# Patient Record
Sex: MALE | Race: BLACK OR AFRICAN AMERICAN | Employment: FULL TIME | URBAN - METROPOLITAN AREA
[De-identification: names, ages, dates, MRNs, and addresses within clinical notes are randomized per-mention and may not be internally consistent; named-entity substitution may affect disease eponyms.]

---

## 2017-11-14 ENCOUNTER — HOSPITAL ENCOUNTER (EMERGENCY)
Age: 47
Discharge: HOME | End: 2017-11-14
Attending: EMERGENCY MEDICINE
Payer: OTHER GOVERNMENT

## 2017-11-14 ENCOUNTER — APPOINTMENT (OUTPATIENT)
Dept: GENERAL RADIOLOGY | Age: 47
End: 2017-11-14
Attending: EMERGENCY MEDICINE
Payer: OTHER GOVERNMENT

## 2017-11-14 ENCOUNTER — APPOINTMENT (OUTPATIENT)
Dept: CT IMAGING | Age: 47
End: 2017-11-14
Attending: EMERGENCY MEDICINE
Payer: OTHER GOVERNMENT

## 2017-11-14 VITALS
HEIGHT: 73 IN | WEIGHT: 210 LBS | OXYGEN SATURATION: 98 % | TEMPERATURE: 97.8 F | RESPIRATION RATE: 20 BRPM | DIASTOLIC BLOOD PRESSURE: 95 MMHG | HEART RATE: 74 BPM | BODY MASS INDEX: 27.83 KG/M2 | SYSTOLIC BLOOD PRESSURE: 133 MMHG

## 2017-11-14 DIAGNOSIS — R51.9 HEADACHE, UNSPECIFIED HEADACHE TYPE: Primary | ICD-10-CM

## 2017-11-14 LAB
ALBUMIN SERPL-MCNC: 3.6 G/DL (ref 3.4–5)
ALBUMIN/GLOB SERPL: 1 {RATIO} (ref 0.8–1.7)
ALP SERPL-CCNC: 72 U/L (ref 45–117)
ALT SERPL-CCNC: 35 U/L (ref 16–61)
AMPHET UR QL SCN: NEGATIVE
ANION GAP BLD CALC-SCNC: 13 MMOL/L (ref 10–20)
ANION GAP SERPL CALC-SCNC: 7 MMOL/L (ref 3–18)
APPEARANCE UR: CLEAR
ASPIRIN TEST, ASPIRN: 575 ARU
AST SERPL-CCNC: 22 U/L (ref 15–37)
BARBITURATES UR QL SCN: NEGATIVE
BASOPHILS # BLD: 0 K/UL (ref 0–0.06)
BASOPHILS NFR BLD: 0 % (ref 0–2)
BENZODIAZ UR QL: NEGATIVE
BILIRUB SERPL-MCNC: 0.5 MG/DL (ref 0.2–1)
BILIRUB UR QL: NEGATIVE
BUN BLD-MCNC: 12 MG/DL (ref 7–18)
BUN SERPL-MCNC: 11 MG/DL (ref 7–18)
BUN/CREAT SERPL: 10 (ref 12–20)
CA-I BLD-MCNC: 1.24 MMOL/L (ref 1.12–1.32)
CALCIUM SERPL-MCNC: 8.7 MG/DL (ref 8.5–10.1)
CANNABINOIDS UR QL SCN: NEGATIVE
CHLORIDE BLD-SCNC: 104 MMOL/L (ref 100–108)
CHLORIDE SERPL-SCNC: 104 MMOL/L (ref 100–108)
CK MB CFR SERPL CALC: NORMAL % (ref 0–4)
CK MB SERPL-MCNC: <1 NG/ML (ref 5–25)
CK SERPL-CCNC: 261 U/L (ref 39–308)
CO2 BLD-SCNC: 29 MMOL/L (ref 19–24)
CO2 SERPL-SCNC: 29 MMOL/L (ref 21–32)
COCAINE UR QL SCN: NEGATIVE
COLOR UR: YELLOW
CREAT SERPL-MCNC: 1.08 MG/DL (ref 0.6–1.3)
CREAT UR-MCNC: 1 MG/DL (ref 0.6–1.3)
DIFFERENTIAL METHOD BLD: ABNORMAL
EOSINOPHIL # BLD: 0.2 K/UL (ref 0–0.4)
EOSINOPHIL NFR BLD: 4 % (ref 0–5)
ERYTHROCYTE [DISTWIDTH] IN BLOOD BY AUTOMATED COUNT: 13.3 % (ref 11.6–14.5)
FIBRINOGEN PPP-MCNC: 312 MG/DL (ref 210–451)
GLOBULIN SER CALC-MCNC: 3.7 G/DL (ref 2–4)
GLUCOSE BLD STRIP.AUTO-MCNC: 119 MG/DL (ref 70–110)
GLUCOSE BLD STRIP.AUTO-MCNC: 130 MG/DL (ref 74–106)
GLUCOSE SERPL-MCNC: 128 MG/DL (ref 74–99)
GLUCOSE UR STRIP.AUTO-MCNC: NEGATIVE MG/DL
HCT VFR BLD AUTO: 42.4 % (ref 36–48)
HCT VFR BLD CALC: 46 % (ref 36–49)
HDSCOM,HDSCOM: NORMAL
HGB BLD-MCNC: 14.9 G/DL (ref 13–16)
HGB BLD-MCNC: 15.6 G/DL (ref 12–16)
HGB UR QL STRIP: NEGATIVE
INR PPP: 1 (ref 0.8–1.2)
KETONES UR QL STRIP.AUTO: NEGATIVE MG/DL
LEUKOCYTE ESTERASE UR QL STRIP.AUTO: NEGATIVE
LYMPHOCYTES # BLD: 1.8 K/UL (ref 0.9–3.6)
LYMPHOCYTES NFR BLD: 38 % (ref 21–52)
MCH RBC QN AUTO: 32.1 PG (ref 24–34)
MCHC RBC AUTO-ENTMCNC: 35.1 G/DL (ref 31–37)
MCV RBC AUTO: 91.4 FL (ref 74–97)
METHADONE UR QL: NEGATIVE
MONOCYTES # BLD: 0.9 K/UL (ref 0.05–1.2)
MONOCYTES NFR BLD: 18 % (ref 3–10)
NEUTS SEG # BLD: 1.9 K/UL (ref 1.8–8)
NEUTS SEG NFR BLD: 40 % (ref 40–73)
NITRITE UR QL STRIP.AUTO: NEGATIVE
OPIATES UR QL: NEGATIVE
P2Y12 PLT RESPONSE,PPPR: 231 PRU (ref 194–418)
PCP UR QL: NEGATIVE
PH UR STRIP: 5.5 [PH] (ref 5–8)
PLATELET # BLD AUTO: 162 K/UL (ref 135–420)
PMV BLD AUTO: 11 FL (ref 9.2–11.8)
POTASSIUM BLD-SCNC: 3.4 MMOL/L (ref 3.5–5.5)
POTASSIUM SERPL-SCNC: 3.4 MMOL/L (ref 3.5–5.5)
PROT SERPL-MCNC: 7.3 G/DL (ref 6.4–8.2)
PROT UR STRIP-MCNC: NEGATIVE MG/DL
PROTHROMBIN TIME: 13.1 SEC (ref 11.5–15.2)
RBC # BLD AUTO: 4.64 M/UL (ref 4.7–5.5)
SODIUM BLD-SCNC: 141 MMOL/L (ref 136–145)
SODIUM SERPL-SCNC: 140 MMOL/L (ref 136–145)
SP GR UR REFRACTOMETRY: >1.03 (ref 1–1.03)
THROMBIN TIME: 17.3 SECS (ref 13.8–18.2)
TROPONIN I SERPL-MCNC: <0.02 NG/ML (ref 0–0.04)
UROBILINOGEN UR QL STRIP.AUTO: 0.2 EU/DL (ref 0.2–1)
WBC # BLD AUTO: 4.7 K/UL (ref 4.6–13.2)

## 2017-11-14 PROCEDURE — 85576 BLOOD PLATELET AGGREGATION: CPT | Performed by: EMERGENCY MEDICINE

## 2017-11-14 PROCEDURE — 80307 DRUG TEST PRSMV CHEM ANLYZR: CPT | Performed by: EMERGENCY MEDICINE

## 2017-11-14 PROCEDURE — 85610 PROTHROMBIN TIME: CPT | Performed by: EMERGENCY MEDICINE

## 2017-11-14 PROCEDURE — 85025 COMPLETE CBC W/AUTO DIFF WBC: CPT | Performed by: EMERGENCY MEDICINE

## 2017-11-14 PROCEDURE — 85670 THROMBIN TIME PLASMA: CPT | Performed by: EMERGENCY MEDICINE

## 2017-11-14 PROCEDURE — 85384 FIBRINOGEN ACTIVITY: CPT | Performed by: EMERGENCY MEDICINE

## 2017-11-14 PROCEDURE — 99285 EMERGENCY DEPT VISIT HI MDM: CPT

## 2017-11-14 PROCEDURE — 86900 BLOOD TYPING SEROLOGIC ABO: CPT | Performed by: EMERGENCY MEDICINE

## 2017-11-14 PROCEDURE — 93005 ELECTROCARDIOGRAM TRACING: CPT

## 2017-11-14 PROCEDURE — 70496 CT ANGIOGRAPHY HEAD: CPT

## 2017-11-14 PROCEDURE — 74011636320 HC RX REV CODE- 636/320: Performed by: EMERGENCY MEDICINE

## 2017-11-14 PROCEDURE — 74011250637 HC RX REV CODE- 250/637: Performed by: EMERGENCY MEDICINE

## 2017-11-14 PROCEDURE — 82962 GLUCOSE BLOOD TEST: CPT

## 2017-11-14 PROCEDURE — 80047 BASIC METABLC PNL IONIZED CA: CPT

## 2017-11-14 PROCEDURE — 80053 COMPREHEN METABOLIC PANEL: CPT | Performed by: EMERGENCY MEDICINE

## 2017-11-14 PROCEDURE — 82550 ASSAY OF CK (CPK): CPT | Performed by: EMERGENCY MEDICINE

## 2017-11-14 PROCEDURE — 81003 URINALYSIS AUTO W/O SCOPE: CPT | Performed by: EMERGENCY MEDICINE

## 2017-11-14 PROCEDURE — 74011000258 HC RX REV CODE- 258: Performed by: EMERGENCY MEDICINE

## 2017-11-14 PROCEDURE — 70450 CT HEAD/BRAIN W/O DYE: CPT

## 2017-11-14 RX ORDER — ASPIRIN 81 MG/1
81 TABLET ORAL DAILY
Qty: 90 TAB | Refills: 6 | Status: SHIPPED | OUTPATIENT
Start: 2017-11-14

## 2017-11-14 RX ORDER — GUAIFENESIN 100 MG/5ML
162 LIQUID (ML) ORAL
Status: COMPLETED | OUTPATIENT
Start: 2017-11-14 | End: 2017-11-14

## 2017-11-14 RX ORDER — SODIUM CHLORIDE 9 MG/ML
100 INJECTION, SOLUTION INTRAVENOUS ONCE
Status: COMPLETED | OUTPATIENT
Start: 2017-11-14 | End: 2017-11-14

## 2017-11-14 RX ORDER — BUTALBITAL, ACETAMINOPHEN AND CAFFEINE 300; 40; 50 MG/1; MG/1; MG/1
1 CAPSULE ORAL
Qty: 12 CAP | Refills: 0 | Status: SHIPPED | OUTPATIENT
Start: 2017-11-14

## 2017-11-14 RX ADMIN — ASPIRIN 81 MG 162 MG: 81 TABLET ORAL at 22:36

## 2017-11-14 RX ADMIN — SODIUM CHLORIDE 95 ML: 900 INJECTION, SOLUTION INTRAVENOUS at 20:42

## 2017-11-14 RX ADMIN — IOPAMIDOL 80 ML: 755 INJECTION, SOLUTION INTRAVENOUS at 20:42

## 2017-11-14 NOTE — LETTER
NOTIFICATION RETURN TO WORK / SCHOOL 
 
11/14/2017 10:00 PM 
 
Mr. Jus Nair Scaryasmeen To Whom It May Concern: 
 
Jus Nair is currently under the care of [facility] EMERGENCY DEPT. He will return to work/school on: 11/16/17 If there are questions or concerns please have the patient contact our office. Sincerely, Joe Mandujano MD

## 2017-11-15 LAB
ABO + RH BLD: NORMAL
ATRIAL RATE: 79 BPM
BLOOD GROUP ANTIBODIES SERPL: NORMAL
CALCULATED P AXIS, ECG09: 61 DEGREES
CALCULATED R AXIS, ECG10: 9 DEGREES
CALCULATED T AXIS, ECG11: 15 DEGREES
DIAGNOSIS, 93000: NORMAL
P-R INTERVAL, ECG05: 136 MS
Q-T INTERVAL, ECG07: 368 MS
QRS DURATION, ECG06: 94 MS
QTC CALCULATION (BEZET), ECG08: 421 MS
SPECIMEN EXP DATE BLD: NORMAL
VENTRICULAR RATE, ECG03: 79 BPM

## 2017-11-15 NOTE — ED TRIAGE NOTES
Pt c/o headache x 2 days with pressure behind L eye. Denies vision changes. States he has a stroke 2 years ago with same symptoms.

## 2017-11-15 NOTE — ED PROVIDER NOTES
HPI Comments: Eddi Coelho is a 56 y.o. Male who states he was dx with cva in [place] about 2 years ago on ct, mri, after presenting to ER with left sided headache and left eye pressure. States he has had similar headache on left side, retroorbital, constant pressure for last 2 days with no associated eye redness, drainage, new visual changes, weakness, tingling, numbness, diff speaking, swallowing, walking. No neck pain, facial droop. No exacerbating factors. 2 pills at home today with helped although cannot remember what they were. Has not been compliant with aspirin. 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 The history is provided by the patient and medical records. Past Medical History:   Diagnosis Date    Hematospermia     Hematuria     High cholesterol     Hypercholesteremia     Hypertension     Testicular pain        Past Surgical History:   Procedure Laterality Date    HX ORTHOPAEDIC  2010    left achillies    HX ORTHOPAEDIC  1986    left elbow         Family History:   Problem Relation Age of Onset    Cancer Mother      colon       Social History     Social History    Marital status:      Spouse name: N/A    Number of children: N/A    Years of education: N/A     Occupational History    Not on file. Social History Main Topics    Smoking status: Never Smoker    Smokeless tobacco: Never Used    Alcohol use Yes      Comment: occasionally    Drug use: No    Sexual activity: Yes     Other Topics Concern    Not on file     Social History Narrative         ALLERGIES: Review of patient's allergies indicates no known allergies. Review of Systems   Constitutional: Negative for fever. HENT: Negative for congestion, ear pain, facial swelling, sore throat and trouble swallowing. Eyes: Positive for visual disturbance. Respiratory: Negative for cough. Cardiovascular: Negative for chest pain.    Gastrointestinal: Negative for abdominal pain, nausea and vomiting. Endocrine: Negative for polyuria. Musculoskeletal: Negative for gait problem. Skin: Negative for rash. Allergic/Immunologic: Negative for immunocompromised state. Neurological: Positive for headaches. Negative for dizziness, syncope, facial asymmetry, speech difficulty, weakness and numbness. Hematological: Does not bruise/bleed easily. Psychiatric/Behavioral: Positive for sleep disturbance. Negative for confusion. Vitals:    11/14/17 2125 11/14/17 2130 11/14/17 2135 11/14/17 2140   BP: (!) 142/97 (!) 131/97 (!) 127/98 (!) 131/95   Pulse: 75 76 79 77   Resp: 17 20 18 21   Temp:       SpO2: 97% 97% 97% 97%   Weight:       Height:                Physical Exam   Constitutional: He is oriented to person, place, and time. Non-toxic appearance. He does not appear ill. No distress. HENT:   Head: Normocephalic and atraumatic. Right Ear: External ear normal.   Left Ear: External ear normal.   Nose: Nose normal.   Mouth/Throat: Oropharynx is clear and moist. No oropharyngeal exudate. Eyes: Conjunctivae and EOM are normal. Pupils are equal, round, and reactive to light. Right eye exhibits no discharge. Left eye exhibits no discharge, no exudate and no hordeolum. No foreign body present in the left eye. Left conjunctiva is not injected. Left conjunctiva has no hemorrhage. No scleral icterus. Left eye exhibits normal extraocular motion and no nystagmus. Fundoscopic exam:       The left eye shows no arteriolar narrowing, no hemorrhage and no papilledema. Neck: Normal range of motion. Cardiovascular: Normal rate, regular rhythm, normal heart sounds and intact distal pulses. Pulmonary/Chest: Effort normal and breath sounds normal. No respiratory distress. Abdominal: Soft. There is no tenderness. Musculoskeletal: Normal range of motion. He exhibits no edema. Neurological: He is alert and oriented to person, place, and time.  He displays no tremor. No cranial nerve deficit (3-12) or sensory deficit (gross touch intact). He exhibits normal muscle tone. He displays no seizure activity. Coordination and gait normal. GCS eye subscore is 4. GCS verbal subscore is 5. GCS motor subscore is 6.   nihss 0   Skin: Skin is warm and dry. He is not diaphoretic. Psychiatric: His behavior is normal.   Nursing note and vitals reviewed.        [facility]  ED Course       Procedures    Vitals:  Patient Vitals for the past 12 hrs:   Temp Pulse Resp BP SpO2   11/14/17 2140 - 77 21 (!) 131/95 97 %   11/14/17 2135 - 79 18 (!) 127/98 97 %   11/14/17 2130 - 76 20 (!) 131/97 97 %   11/14/17 2125 - 75 17 (!) 142/97 97 %   11/14/17 2120 - 75 21 (!) 126/103 99 %   11/14/17 2115 - 77 18 (!) 132/94 98 %   11/14/17 2110 - 77 21 (!) 138/99 98 %   11/14/17 2105 - 74 20 (!) 138/97 98 %   11/14/17 2100 - 78 16 (!) 141/104 99 %   11/14/17 2055 - 75 16 (!) 138/97 98 %   11/14/17 2050 - 79 20 (!) 143/97 100 %   11/14/17 2047 97.8 °F (36.6 °C) 75 12 (!) 142/98 99 %   11/14/17 2045 - 77 15 (!) 143/106 99 %   11/14/17 2041 - 76 20 - 100 %   11/14/17 2040 - - - (!) 142/98 -   11/14/17 2036 - - - (!) 151/93 -   11/14/17 2006 97.8 °F (36.6 °C) 76 18 (!) 151/103 100 %         Medications ordered:   Medications   aspirin chewable tablet 162 mg (not administered)   iopamidol (ISOVUE-370) 76 % injection 100 mL (80 mL IntraVENous Given 11/14/17 2042)   0.9% sodium chloride infusion 100 mL (95 mL IntraVENous New Bag 11/14/17 2042)         Lab findings:  Recent Results (from the past 12 hour(s))   EKG, 12 LEAD, INITIAL    Collection Time: 11/14/17  8:17 PM   Result Value Ref Range    Ventricular Rate 79 BPM    Atrial Rate 79 BPM    P-R Interval 136 ms    QRS Duration 94 ms    Q-T Interval 368 ms    QTC Calculation (Bezet) 421 ms    Calculated P Axis 61 degrees    Calculated R Axis 9 degrees    Calculated T Axis 15 degrees    Diagnosis       Normal sinus rhythm  Normal ECG  When compared with ECG of 05-FEB-2011 09:47,  No significant change was found     METABOLIC PANEL, COMPREHENSIVE    Collection Time: 11/14/17  8:20 PM   Result Value Ref Range    Sodium 140 136 - 145 mmol/L    Potassium 3.4 (L) 3.5 - 5.5 mmol/L    Chloride 104 100 - 108 mmol/L    CO2 29 21 - 32 mmol/L    Anion gap 7 3.0 - 18 mmol/L    Glucose 128 (H) 74 - 99 mg/dL    BUN 11 7.0 - 18 MG/DL    Creatinine 1.08 0.6 - 1.3 MG/DL    BUN/Creatinine ratio 10 (L) 12 - 20      GFR est AA >60 >60 ml/min/1.73m2    GFR est non-AA >60 >60 ml/min/1.73m2    Calcium 8.7 8.5 - 10.1 MG/DL    Bilirubin, total 0.5 0.2 - 1.0 MG/DL    ALT (SGPT) 35 16 - 61 U/L    AST (SGOT) 22 15 - 37 U/L    Alk.  phosphatase 72 45 - 117 U/L    Protein, total 7.3 6.4 - 8.2 g/dL    Albumin 3.6 3.4 - 5.0 g/dL    Globulin 3.7 2.0 - 4.0 g/dL    A-G Ratio 1.0 0.8 - 1.7     PROTHROMBIN TIME + INR    Collection Time: 11/14/17  8:20 PM   Result Value Ref Range    Prothrombin time 13.1 11.5 - 15.2 sec    INR 1.0 0.8 - 1.2     THROMBIN TIME    Collection Time: 11/14/17  8:20 PM   Result Value Ref Range    Thrombin time 17.3 13.8 - 18.2 SECS   CARDIAC PANEL,(CK, CKMB & TROPONIN)    Collection Time: 11/14/17  8:20 PM   Result Value Ref Range     39 - 308 U/L    CK - MB <1.0 <3.6 ng/ml    CK-MB Index  0.0 - 4.0 %     CALCULATION NOT PERFORMED WHEN RESULT IS BELOW LINEAR LIMIT    Troponin-I, Qt. <0.02 0.0 - 0.045 NG/ML   FIBRINOGEN    Collection Time: 11/14/17  8:20 PM   Result Value Ref Range    Fibrinogen 312 210 - 451 mg/dL   ASPIRIN TEST    Collection Time: 11/14/17  8:20 PM   Result Value Ref Range    Aspirin test 575 ARU   PLATELET FUNCTION, VERIFY NOW P2Y12    Collection Time: 11/14/17  8:20 PM   Result Value Ref Range    P2Y12 Plt response 231 194 - 418 PRU   CBC WITH AUTOMATED DIFF    Collection Time: 11/14/17  8:20 PM   Result Value Ref Range    WBC 4.7 4.6 - 13.2 K/uL    RBC 4.64 (L) 4.70 - 5.50 M/uL    HGB 14.9 13.0 - 16.0 g/dL    HCT 42.4 36.0 - 48.0 %    MCV 91.4 74.0 - 97.0 FL    MCH 32.1 24.0 - 34.0 PG    MCHC 35.1 31.0 - 37.0 g/dL    RDW 13.3 11.6 - 14.5 %    PLATELET 089 764 - 512 K/uL    MPV 11.0 9.2 - 11.8 FL    NEUTROPHILS 40 40 - 73 %    LYMPHOCYTES 38 21 - 52 %    MONOCYTES 18 (H) 3 - 10 %    EOSINOPHILS 4 0 - 5 %    BASOPHILS 0 0 - 2 %    ABS. NEUTROPHILS 1.9 1.8 - 8.0 K/UL    ABS. LYMPHOCYTES 1.8 0.9 - 3.6 K/UL    ABS. MONOCYTES 0.9 0.05 - 1.2 K/UL    ABS. EOSINOPHILS 0.2 0.0 - 0.4 K/UL    ABS.  BASOPHILS 0.0 0.0 - 0.06 K/UL    DF AUTOMATED     GLUCOSE, POC    Collection Time: 11/14/17  8:20 PM   Result Value Ref Range    Glucose (POC) 119 (H) 70 - 110 mg/dL   POC CHEM8    Collection Time: 11/14/17  8:23 PM   Result Value Ref Range    CO2, POC 29 (H) 19 - 24 MMOL/L    Glucose,  (H) 74 - 106 MG/DL    BUN, POC 12 7 - 18 MG/DL    Creatinine, POC 1.0 0.6 - 1.3 MG/DL    GFRAA, POC >60 >60 ml/min/1.73m2    GFRNA, POC >60 >60 ml/min/1.73m2    Sodium,  136 - 145 MMOL/L    Potassium, POC 3.4 (L) 3.5 - 5.5 MMOL/L    Calcium, ionized (POC) 1.24 1.12 - 1.32 MMOL/L    Chloride,  100 - 108 MMOL/L    Anion gap, POC 13 10 - 20      Hematocrit, POC 46 36 - 49 %    Hemoglobin, POC 15.6 12 - 16 G/DL   URINALYSIS W/ RFLX MICROSCOPIC    Collection Time: 11/14/17  9:21 PM   Result Value Ref Range    Color YELLOW      Appearance CLEAR      Specific gravity >1.030 (H) 1.005 - 1.030    pH (UA) 5.5 5.0 - 8.0      Protein NEGATIVE  NEG mg/dL    Glucose NEGATIVE  NEG mg/dL    Ketone NEGATIVE  NEG mg/dL    Bilirubin NEGATIVE  NEG      Blood NEGATIVE  NEG      Urobilinogen 0.2 0.2 - 1.0 EU/dL    Nitrites NEGATIVE  NEG      Leukocyte Esterase NEGATIVE  NEG     DRUG SCREEN, URINE    Collection Time: 11/14/17  9:21 PM   Result Value Ref Range    BENZODIAZEPINES NEGATIVE  NEG      BARBITURATES NEGATIVE  NEG      THC (TH-CANNABINOL) NEGATIVE  NEG      OPIATES NEGATIVE  NEG      PCP(PHENCYCLIDINE) NEGATIVE  NEG      COCAINE NEGATIVE  NEG      AMPHETAMINES NEGATIVE  NEG      METHADONE NEGATIVE       HDSCOM (NOTE)        EKG interpretation by ED Physician:  nsr with no acute st tw changes  Rate 79, qtc 421  Nl ecg  No changes from previous    X-Ray, CT or other radiology findings or impressions:  CT HEAD WO CONT   Final Result      XR CHEST PORT    (Results Pending)   CTA HEAD NECK W CONT    (Results Pending)   cta with nap, per [facility]    Progress notes, Consult notes or additional Procedure notes:   No recurrent sx. D/w Dr Sabiha Kirby on call for teleneuro who saw pt feels not c/w acute cva, likely migraine. Can be d/c home on aspirin  I have discussed with patient and/or family/sig other the results, interpretation of any imaging if performed, suspected diagnosis and treatment plan to include instructions regarding the diagnoses listed to which understanding was expressed with all questions answered  This patient was seen and evaluated for the complain of a headache. Based upon their history and physical examination, at this time they do not appear to have a subarachnoid hemorrhage, mass lesion, meningitis or encephalitis. As well there appears to be no evidence of temporal arteritis or psuedotumor cerebri. The patient has improved with the therapeutic interventions administered here in the ED and have remained stable with no acute neurological deficits. They feel comfortable going home, and I feel they are safe to be discharged. I have instructed them to return to the ED immediately should they become worse in any way including worsening pain, fevers, neck stiffness, balance problems, extremity weakness/numbness, or confusion. Reevaluation of patient:   Stable for dc    Disposition:  Diagnosis:   1.  Headache, unspecified headache type        Disposition: home      Follow-up Information     Follow up With Details Comments [id number] Wilson MD Schedule an appointment as soon as possible for a visit  [place] [id number]  [phone]      [facility] EMERGENCY DEPT If symptoms worsen [id number] Casandra Colon MD Schedule an appointment as soon as possible for a visit for recheck of your eye [address] [facility]. 18.  [phone]              Patient's Medications   Start Taking    ASPIRIN DELAYED-RELEASE 81 MG TABLET    Take 1 Tab by mouth daily. BUTALBITAL-ACETAMINOPHEN-CAFF (FIORICET) -40 MG PER CAPSULE    Take 1 Cap by mouth every six (6) hours as needed for Pain or Headache. Max Daily Amount: 4 Caps. Continue Taking    AMLODIPINE (NORVASC) 10 MG TABLET    10 mg. AMLODIPINE (NORVASC) 2.5 MG TABLET        FINASTERIDE (PROSCAR) 5 MG TABLET    Take 1 Tab by mouth daily. HYDROCHLOROTHIAZIDE (HYDRODIURIL) 25 MG TABLET    25 mg.    OMEPRAZOLE (PRILOSEC) 20 MG CAPSULE    20 mg. SIMVASTATIN (ZOCOR) 20 MG TABLET    20 mg.    These Medications have changed    No medications on file   Stop Taking    No medications on file